# Patient Record
Sex: FEMALE | Race: WHITE
[De-identification: names, ages, dates, MRNs, and addresses within clinical notes are randomized per-mention and may not be internally consistent; named-entity substitution may affect disease eponyms.]

---

## 2021-10-06 ENCOUNTER — HOSPITAL ENCOUNTER (OUTPATIENT)
Dept: HOSPITAL 75 - PREOP | Age: 36
LOS: 5 days | Discharge: HOME | End: 2021-10-11
Attending: SURGERY
Payer: MEDICAID

## 2021-10-06 VITALS — BODY MASS INDEX: 39.84 KG/M2 | HEIGHT: 69.02 IN | WEIGHT: 268.96 LBS

## 2021-10-06 DIAGNOSIS — Z01.818: Primary | ICD-10-CM

## 2021-10-14 ENCOUNTER — HOSPITAL ENCOUNTER (OUTPATIENT)
Dept: HOSPITAL 75 - SDC | Age: 36
Discharge: HOME | End: 2021-10-14
Attending: SURGERY
Payer: MEDICAID

## 2021-10-14 VITALS — DIASTOLIC BLOOD PRESSURE: 82 MMHG | SYSTOLIC BLOOD PRESSURE: 146 MMHG

## 2021-10-14 VITALS — HEIGHT: 69.02 IN | WEIGHT: 268.96 LBS | BODY MASS INDEX: 39.84 KG/M2

## 2021-10-14 VITALS — DIASTOLIC BLOOD PRESSURE: 79 MMHG | SYSTOLIC BLOOD PRESSURE: 128 MMHG

## 2021-10-14 VITALS — DIASTOLIC BLOOD PRESSURE: 92 MMHG | SYSTOLIC BLOOD PRESSURE: 151 MMHG

## 2021-10-14 VITALS — DIASTOLIC BLOOD PRESSURE: 81 MMHG | SYSTOLIC BLOOD PRESSURE: 134 MMHG

## 2021-10-14 VITALS — SYSTOLIC BLOOD PRESSURE: 146 MMHG | DIASTOLIC BLOOD PRESSURE: 82 MMHG

## 2021-10-14 VITALS — DIASTOLIC BLOOD PRESSURE: 97 MMHG | SYSTOLIC BLOOD PRESSURE: 151 MMHG

## 2021-10-14 VITALS — DIASTOLIC BLOOD PRESSURE: 92 MMHG | SYSTOLIC BLOOD PRESSURE: 148 MMHG

## 2021-10-14 VITALS — SYSTOLIC BLOOD PRESSURE: 134 MMHG | DIASTOLIC BLOOD PRESSURE: 78 MMHG

## 2021-10-14 VITALS — DIASTOLIC BLOOD PRESSURE: 96 MMHG | SYSTOLIC BLOOD PRESSURE: 150 MMHG

## 2021-10-14 VITALS — DIASTOLIC BLOOD PRESSURE: 71 MMHG | SYSTOLIC BLOOD PRESSURE: 149 MMHG

## 2021-10-14 VITALS — SYSTOLIC BLOOD PRESSURE: 150 MMHG | DIASTOLIC BLOOD PRESSURE: 90 MMHG

## 2021-10-14 DIAGNOSIS — Z90.49: ICD-10-CM

## 2021-10-14 DIAGNOSIS — D17.1: Primary | ICD-10-CM

## 2021-10-14 DIAGNOSIS — K21.9: ICD-10-CM

## 2021-10-14 DIAGNOSIS — Z79.899: ICD-10-CM

## 2021-10-14 DIAGNOSIS — Z90.89: ICD-10-CM

## 2021-10-14 DIAGNOSIS — F17.210: ICD-10-CM

## 2021-10-14 PROCEDURE — 84703 CHORIONIC GONADOTROPIN ASSAY: CPT

## 2021-10-14 PROCEDURE — 87081 CULTURE SCREEN ONLY: CPT

## 2021-10-14 NOTE — OPERATIVE REPORT
DATE OF SERVICE:  10/14/2021



PREOPERATIVE DIAGNOSIS:

Right axillary mass.



POSTOPERATIVE DIAGNOSIS:

Right axillary mass.



PROCEDURE PERFORMED:

Excision of right axillary mass 9 x 5 cm.



SURGEON:

Amy Salamanca DO.



ANESTHESIA:

General.



ESTIMATED BLOOD LOSS:

Minimal.



COMPLICATIONS:

None.



INDICATIONS FOR PROCEDURE:

The patient is a 35-year-old female with a right axillary mass, which causes

pain and discomfort.  She understands the risks and benefits of the procedure

and wishes to proceed.  Consent was signed in the chart.



DESCRIPTION OF PROCEDURE:

The patient was taken into the operating suite.  She was prepped and draped in

sterile fashion.  Timeout was performed.  Local anesthetic was infiltrated

around the mass.  A 15-blade scalpel was used to make a small skin incision over

the area of the mass.  Cautery was used to dissect down through the subcutaneous

tissues, hard multilobulated fat was encountered with multiple projections off

of it.  A blunt and cautery dissection was used to dissect around it trying to

remove it in its entirety.  Once removed, the wound was then irrigated with

copious amounts of irrigation and suction.  Hemostasis was achieved.  The

subcutaneous tissues were then reapproximated using 3-0 Vicryl and the skin was

then closed using Skin Affix.  The patient tolerated procedure well without any

complications.  She was taken to recovery room in stable condition.





CC:  Dr. Copeland - requested, unable to deliver.





Job ID: [id number]

DocumentID: [id number]

Dictated Date:  10/14/2021 16:24:38

Transcription Date: 10/14/2021 16:30:52

Dictated By: AMY SALAMANCA DO

## 2021-10-14 NOTE — PROGRESS NOTE-PRE OPERATIVE
Pre-Operative Progress Note


H&P Reviewed


The H&P was reviewed, patient examined and no changes noted.


Date Seen by Provider:  Oct 14, 2021


Time Seen by Provider:  09:15


Date H&P Reviewed:  Oct 14, 2021


Time H&P Reviewed:  09:15


Pre-Operative Diagnosis:  right axillary mass











AMY GOOD DO              Oct 14, 2021 09:15

## 2021-10-14 NOTE — PROGRESS NOTE-POST OPERATIVE
Post-Operative Progess Note


Surgeon (s)/Assistant (s)


Surgeon


AMY GOOD DO


Assistant:  na





Pre-Operative Diagnosis


right axillary mass





Post-Operative Diagnosis





same





Procedure & Operative Findings


Date of Procedure


10/14/21


Procedure Performed/Findings


excision of right axillary mass 9x5cm


Anesthesia Type


general





Estimated Blood Loss


Estimated blood loss (mL):  minimal





Specimens/Packing


Specimens Removed


right axilla lipoma











AMY GOOD DO              Oct 14, 2021 11:45

## 2021-10-14 NOTE — ANESTHESIA-GENERAL POST-OP
General


Patient Condition


Mental Status/LOC:  Same as Preop


Cardiovascular:  Satisfactory


Nausea/Vomiting:  Absent


Respiratory:  Satisfactory


Pain:  Controlled


Complications:  Absent





Post Op Complications


Complications


None





Follow Up Care/Instructions


Patient Instructions


None needed.





Anesthesia/Patient Condition


Patient Condition


Patient was doing well after the procedure, no complaints, stable vital signs, 

no apparent adverse anesthesia problems.   


No complications reported per nursing.











ROXANNE HOLLIS DO         Oct 14, 2021 15:46

## 2021-10-14 NOTE — DISCHARGE INST-SIMPLE/STANDARD
Discharge Inst-Standard


Discharge Medications


New, Converted or Re-Newed RX:  RX on Chart





Patient Instructions/Follow Up


Plan of Care/Instructions/FU:  


2 weeks Cheikh


Activity as Tolerated:  Yes


Discharge Diet:  Regular Diet


Other Inst to Patient


Follow up Appt:


Make appointment for 2 week.





Instructions:


No lifting greater than 10 pounds.


No strenuous activity. 


May shower in 24 hours, no tub bath or soaking.


Use incentive spirometer at home as directed.


No Smoking





Skin/Wound Care:


You have special glue over your incision that will fall off on it's own. 





Symptoms to Report:


Appetite Changes, Extremity Discoloration, Numbness/Tingling, Swelling 

Increased, Bleeding Excessive, Eyesight Changes, Pain Increased, Urine Color 

Change, Constipation(Persistent), Fever over 101 degree F, Pain/Pressure in 

chest, Urinating Difficulty, Cough Up/Vomit Blood, Heart Beat Irreg/Pounding, 

Pain/Pressure in jaw, Vaginal Bleeding Increase, Cramps in feet or legs, 

Lightheadedness, Pain/Pressure in shoulder, Diarrhea(Persistent), Memory Changes

Suddenly, Questions/Concerns, Weight gain consecutive days, Dizziness/Fainting, 

Nausea/Vomiting, Shortness of Breath, Weight gain over 2 pounds








If questions or concerns contact your physician 


Or seek help at emergency department.











AMY GOOD DO              Oct 14, 2021 11:42